# Patient Record
(demographics unavailable — no encounter records)

---

## 2024-10-16 NOTE — PHYSICAL EXAM
[General Appearance - Well Developed] : well developed [General Appearance - Well Nourished] : well nourished [Normal Appearance] : normal appearance [Well Groomed] : well groomed [General Appearance - In No Acute Distress] : no acute distress [Abdomen Soft] : soft [Abdomen Tenderness] : non-tender [Abdomen Mass (___ Cm)] : no abdominal mass palpated [Abdomen Hernia] : no hernia was discovered [Costovertebral Angle Tenderness] : no ~M costovertebral angle tenderness [Urethral Meatus] : meatus normal [Penis Abnormality] : normal circumcised penis [Urinary Bladder Findings] : the bladder was normal on palpation [Scrotum] : the scrotum was normal [Epididymis] : the epididymides were normal [Testes Tenderness] : no tenderness of the testes [Testes Mass (___cm)] : there were no testicular masses [Prostate Tenderness] : the prostate was not tender [No Prostate Nodules] : no prostate nodules [Skin Color & Pigmentation] : normal skin color and pigmentation [Heart Rate And Rhythm] : Heart rate and rhythm were normal [Edema] : no peripheral edema [] : no respiratory distress [Respiration, Rhythm And Depth] : normal respiratory rhythm and effort [Exaggerated Use Of Accessory Muscles For Inspiration] : no accessory muscle use [Oriented To Time, Place, And Person] : oriented to person, place, and time [Affect] : the affect was normal [Mood] : the mood was normal [Not Anxious] : not anxious [Normal Station and Gait] : the gait and station were normal for the patient's age [No Focal Deficits] : no focal deficits [Sensation] : the sensory exam was normal to light touch and pinprick [Inguinal Lymph Nodes Enlarged Bilaterally] : inguinal

## 2024-10-17 NOTE — LETTER BODY
[Dear  ___] : Dear  [unfilled], [Please see my note below.] : Please see my note below. [Consult Closing:] : Thank you very much for allowing me to participate in the care of this patient.  If you have any questions, please do not hesitate to contact me. [Sincerely,] : Sincerely, [DrTanner  ___] : Dr. ALVAREZ [DrTanner ___] : Dr. ALVAREZ [FreeTextEntry3] : Suraj Santana MD, FACS\par

## 2024-10-17 NOTE — ASSESSMENT
[FreeTextEntry1] : I discussed the findings and options with Mr. JULIETTE GRAY in detail.  He is satisfied with his urination and does not want to pursue pharmacologic management.  His PSAs have remained stable and we agreed that he would simply follow-up in 1 year for a PSA and repeat postvoid sonogram.     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

## 2024-10-17 NOTE — HISTORY OF PRESENT ILLNESS
[FreeTextEntry1] : Mr. JULIETTE SALGADO returns for a semiannual follow-up. He reports stable minimal lower urinary tract symptoms (mostly obstructive) with nocturia x 3. He is no longer taking alfuzosin.  In December 2013 he went into acute retention (without an identifiable predisposing factor). IPSS: 7/1 Sono (performed to assess bladder emptying): 52cc PVR  Mr. Salgado also has longstanding erectile dysfunction, which he manages with Cialis 1/2 20mg with a satisfactory response.  PSAs: 9/23/24--9.34; 4/18/24--10.7; 11/21/23--10.2 (PSAD= 0.08); 8/8/23--9.08; 5/26/22--8.31; 2/16/21--8.18; 9/18/19--7.3 (32%); 9/10/18--6.6 (30%); 12/15/17--7.42; 12/17/15--6.8 (25%); 11/12/14--6.9 (28%); 11/7/13--6.4 (28%)  4K score:  9/10/18--5%  Prostate bx: 12/6/13--BPH (He experienced sepsis after this biopsy)  Prostate MRI: 11/24/23--PI-RADS 3.  124 cc prostate; 10/28/19--severe BPH  Abd MRI:  5/8/23--No suspicious liver lesion.  Few benign hepatic hemangiomas corresponding to the lesion on CT.

## 2024-11-07 NOTE — PLAN
[FreeTextEntry1] : CAD - with statin- not at goal LD_- he will f/up with Dr Alejandro-to discuss options HTN - controlled Elev PSA - can check annually - Pirads 2- low risk of malignancy Elevated serum Cr - increase fluids, avoid nephrotoxic agents such as nsaids General fatigue - for now will monitor- if this issue persists he will follow up for further evaluation

## 2024-11-07 NOTE — HISTORY OF PRESENT ILLNESS
[de-identified] : 77 yo M with HTN, HLD, BPH, CAD s/p PCI 5/23 - started [id number] with his crestor 10mg. which helped-  LDL72- increased to 20mg but did not tolerate it.  - now back on crestor 10mg.   Playing a lot of tennis - in the last 6 months-  overall energy is lower.   - ankle issue- seeing podaitrist- wearing inserts.  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  - Elev PSA - MRI with pirads 2.    - stays hydrated.  - using an Infrared sauna.   note:  - he did not tolerate crestor 10mg - now on 5mg.   back to [employer] [facility] partally  following with Dr Santana, Dr Valerio, Dr Ahumada.   had shingrix.   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 due

## 2024-11-11 NOTE — REVIEW OF SYSTEMS
[Abdominal Pain] : no abdominal pain [Nausea] : no nausea [Constipation] : no constipation [Diarrhea] : no diarrhea [Melena] : no melena [Negative] : Constitutional [FreeTextEntry7] : as above

## 2024-11-11 NOTE — HISTORY OF PRESENT ILLNESS
[de-identified] : 77 yo M with HTN, HLD, BPH, CAD s/p PCI 5/23 - was having severe heart burn.  stool has been different was mucous.  lying eugenia- the gerd resolved.  - BM- normal consistency but with a white ring.   - energy is good over the weekend.  - Stools have been this way since coming back from [place] - 1.5 weeks ago.  no Pain.  - has some urgency for BM.   Just cut back on Psyllium husk.   - has a concern about his elevated A1C.   note: 11/7/24 - started [id number] with his crestor 10mg. which helped-  LDL72- increased to 20mg but did not tolerate it.  - now back on crestor 10mg.   Playing a lot of tennis - in the last 6 months-  overall energy is lower.   - ankle issue- seeing podaitrist- wearing inserts.  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  - Elev PSA - MRI with pirads 2.    - stays hydrated.  - using an Infrared sauna.   note:  - he did not tolerate crestor 10mg - now on 5mg.   back to [facility] partally  following with Dr Santana, Dr Valerio, Dr Ahumada.   had shingrix.   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 due

## 2024-11-11 NOTE — PHYSICAL EXAM
[Soft] : abdomen soft [Non-distended] : non-distended [No Masses] : no abdominal mass palpated [No HSM] : no HSM [Normal] : no posterior cervical lymphadenopathy and no anterior cervical lymphadenopathy [de-identified] : mild llq tenderness, no rebound or guarding

## 2024-11-11 NOTE — PLAN
[FreeTextEntry1] : Suspect mild case of diverticulitis after travel - with improving symptoms can check labs today but hold off on CT a/p unless symptoms regress.

## 2024-12-26 NOTE — PLAN
[FreeTextEntry1] : HLD - continue lower dose of statin  check lipids today Elevated serum creatinine - check bmp today.  further workup based on results.

## 2024-12-26 NOTE — HISTORY OF PRESENT ILLNESS
[de-identified] : 79 yo M with HTN, HLD, BPH, CAD s/p PCI 5/23 - here for repeat labs after having elevated serum Creatinine from labs in november  note: 11/11/24 - was having severe heart burn.  stool has been different was mucous.  lying eugenia- the gerd resolved.  - BM- normal consistency but with a white ring.   - energy is good over the weekend.  - Stools have been this way since coming back from [place] - 1.5 weeks ago.  no Pain.  - has some urgency for BM.   Just cut back on Psyllium husk.   - has a concern about his elevated A1C.   note: 11/7/24 - started [id number] with his crestor 10mg. which helped-  LDL72- increased to 20mg but did not tolerate it.  - now back on crestor 10mg.   Playing a lot of tennis - in the last 6 months-  overall energy is lower.   - ankle issue- seeing podaitrist- wearing inserts.  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  - Elev PSA - MRI with pirads 2.    - stays hydrated.  - using an Infrared sauna.   note:  - he did not tolerate crestor 10mg - now on 5mg.   back to [place] partally  following with Dr Santana, Dr Valerio, Dr Ahumada.   had shingrix.   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 due

## 2025-01-07 NOTE — DISCUSSION/SUMMARY
[Patient] : the patient [___ Month(s)] : in [unfilled] month(s) [FreeTextEntry1] : 79 y/o male with h/o htn, hl, gerd, predm, cad who presents for f/up today s/p PCI xience dLCx, D1, pLAD 5/23   -will order repatha as could not tolerate higher dose crestor or zetia -continue crestor   -Cath 5/23: LM mild, prox LAD 75%, D1 80%, dLCx 99%, OM1 mild, prox/mid RCA 30-50%, edp 16, s/p PCI 99% dLCx w xience, s/p PCI 80% D1 w xience, s/p PCI 75% pLAD w xience  -CTA cor 5/23: Cardiac: 1. The calcium score is severe at 946 Agatston units, which is at the 74 percentile, adjusted for age, gender and race. 2. Severe stenosis in D1 and mid LCx. 3. Mild to moderate stenosis in proximal LAD and OM2. 4. Nonobstructive coronary artery disease in remaining segments. Non-cardiac: No acute findings. Small incidental hepatic lesion, likely benign, may be confirmed by nonemergent contrast MRI.  -LpA elevated -ekg 9/24 - nsr, lad, no st/t changes  -labs 2024 reviewed  -on synthroid for thyroid  -continue norvasc  -continue asa  -counseled on cvd risk factors  -Calcium score 9/22: 904, 75%, small calcified aortic mural plaque, mod calcification AV, small type 1 hiatal hernia, mild gynecomastia  -Echo 1/23: ef 55%, abnormal paradoxical septal motion c/w conduction delay, septal wall thickness increased, trace mr, mild tr, prox asc aorta borderline dilated  -Echo 2020: ef 65%, mild basal septal lvh 1.6 cm, no wma, mild mac, mild mr/tr/pr  -f/up 3 months for cad, lipids    I have spent 30 minutes reviewing labs, records, tests and discussing cvd risk factors, cad, lipids.

## 2025-04-25 NOTE — HISTORY OF PRESENT ILLNESS
[No Pertinent Pulmonary History] : no history of asthma, COPD, sleep apnea, or smoking [No Adverse Anesthesia Reaction] : no adverse anesthesia reaction in self or family member [(Patient denies any chest pain, claudication, dyspnea on exertion, orthopnea, palpitations or syncope)] : Patient denies any chest pain, claudication, dyspnea on exertion, orthopnea, palpitations or syncope [Excellent (>10 METs)] : Excellent (>10 METs) [Coronary Artery Disease] : coronary artery disease [Aortic Stenosis] : no aortic stenosis [Atrial Fibrillation] : no atrial fibrillation [Recent Myocardial Infarction] : no recent myocardial infarction [Implantable Device/Pacemaker] : no implantable device/pacemaker [FreeTextEntry1] : left lens dislocation [FreeTextEntry4] :     77 y/o male with h/o htn, hl, gerd, predm, cad, hypothyroid, cad s/p PCI xience dLCx, D1, pLAD 5/23  - here for pre op erative evaluation for left eye lens dislocation - right ankle concern- left ankle swelling for past 8 months - pain after 1 hour of tennis.  Compression sock helps.

## 2025-04-25 NOTE — PHYSICAL EXAM
[Normal] : affect was normal and insight and judgment were intact [de-identified] : trace edema at ankles right > left

## 2025-04-25 NOTE — REVIEW OF SYSTEMS
[Vision Problems] : vision problems [Negative] : Psychiatric [FreeTextEntry3] : loss of peripheral vision left

## 2025-04-29 NOTE — HISTORY OF PRESENT ILLNESS
[FreeTextEntry1] : 79 y/o male with h/o htn, hl, gerd, predm, cad, hypothyroid, cad s/p PCI xience dLCx, D1, pLAD  who presents for f/up today    last seen  started repatha  could not tolerate crestor 20 so back on 10  no cp, sob, syncope, lh, edema, orthopnea, pnd, palpitations, fatigue  could not tolerate zetia  Cath : LM mild, prox LAD 75%, D1 80%, dLCx 99%, OM1 mild, prox/mid RCA 30-50%, edp 16, s/p PCI 99% dLCx w xience, s/p PCI 80% D1 w xience, s/p PCI 75% pLAD w xience    CTA cor :  IMPRESSION:  Cardiac:  1. The calcium score is severe at 946 Agatston units, which is at the 74 percentile, adjusted for age, gender and race.  2. Severe stenosis in D1 and mid LCx.  3. Mild to moderate stenosis in proximal LAD and OM2.  4. Nonobstructive coronary artery disease in remaining segments.    Non-cardiac:  No acute findings. Small incidental hepatic lesion, likely benign, may be confirmed by nonemergent contrast MRI.    on asa, crestor   -Echo : ef 55%, abnormal paradoxical septal motion c/w conduction delay, septal wall thickness increased, trace mr, mild tr, prox asc aorta borderline dilated  -Calcium score : 904, 75%, small calcified aortic mural plaque, mod calcification AV, small type 1 hiatal hernia, mild gynecomastia  -Echo : ef 65%, mild basal septal lvh 1.6 cm, no wma, mild mac, mild mr/tr/pr    seen by Dr. Robertson from cards  had Echo    seen by Dr. Gibbons  for guzman - referred for nuclear, echo    notes some muscle aches on lipitor 10    pescetarian  under stress with work  runs/jogs twice weekly, spinning twice weekly      PMH/PSH:  cad s/p xience PCI dLCx, D1, pLAD   htn  hl  predm  ED  hearing loss  bph  gerd  mr  elevated psa  right shoulder replacement   basal cell carcinoma excision  retinal detachment repair  strabismus surgery  hypothyroid    ALL:  pcn      MEDS: levothyroxine 50 mcg qd  norvasc 7.5 mg qd  crestor 10 mg qd  asa 81 mg qd  mvi  prilosec  tadalafil 20 mg prn  psyllium  coQ10  florastor  gingko  repatha     SH:  former smoker quit , h/o 3 years - 2-3 cigs/day  no etoh  no drugs  2 children - healthy  from [place]  , remarried  ,       FH:  mother -  gastric cancer 53  father -  heart disease 70's  sister -  53 lung cancer  sister - alive, 73 healthy

## 2025-04-29 NOTE — DISCUSSION/SUMMARY
[Patient] : the patient [___ Month(s)] : in [unfilled] month(s) [EKG obtained to assist in diagnosis and management of assessed problem(s)] : EKG obtained to assist in diagnosis and management of assessed problem(s) [FreeTextEntry1] : 79 y/o male with h/o htn, hl, gerd, predm, cad who presents for f/up today s/p PCI xience dLCx, D1, pLAD 5/23   -continue repatha as could not tolerate higher dose crestor or zetia  -continue crestor - can consider lower dose in future   -Cath 5/23: LM mild, prox LAD 75%, D1 80%, dLCx 99%, OM1 mild, prox/mid RCA 30-50%, edp 16, s/p PCI 99% dLCx w xience, s/p PCI 80% D1 w xience, s/p PCI 75% pLAD w xience  -CTA cor 5/23: Cardiac: 1. The calcium score is severe at 946 Agatston units, which is at the 74 percentile, adjusted for age, gender and race. 2. Severe stenosis in D1 and mid LCx. 3. Mild to moderate stenosis in proximal LAD and OM2. 4. Nonobstructive coronary artery disease in remaining segments. Non-cardiac: No acute findings. Small incidental hepatic lesion, likely benign, may be confirmed by nonemergent contrast MRI.  -LpA elevated  -ekg 1/25 reviewed  -ekg ordered today - nsr, lad, no st/t changes  -labs 2025 reviewed, cbc ordered today  -on synthroid for thyroid  -continue norvasc  -continue asa  -counseled on cvd risk factors  -Calcium score 9/22: 904, 75%, small calcified aortic mural plaque, mod calcification AV, small type 1 hiatal hernia, mild gynecomastia  -Echo 1/23: ef 55%, abnormal paradoxical septal motion c/w conduction delay, septal wall thickness increased, trace mr, mild tr, prox asc aorta borderline dilated  -Echo 2020: ef 65%, mild basal septal lvh 1.6 cm, no wma, mild mac, mild mr/tr/pr  -f/up 6 months for cvd risk factors    I have spent 30 minutes reviewing labs, records, tests and discussing cvd risk factors, cad, lipids.

## 2025-05-12 NOTE — PHYSICAL EXAM
[LE] : Sensory: Intact in bilateral lower extremities [Normal RLE] : Right Lower Extremity: No scars, rashes, lesions, ulcers, skin intact [Normal LLE] : Left Lower Extremity: No scars, rashes, lesions, ulcers, skin intact [Normal Touch] : sensation intact for touch [Normal] : Oriented to person, place, and time, insight and judgement were intact and the affect was normal [de-identified] : Bilateral feet and ankles No edema, ecchymosis, erythema. Severe pes planovalgus deformity bilateral feet.  Positive too many toes signs bilaterally. He walks with a mild limp but denies pain.  He can walk on his heels.  He can walk on the toes but without heel inversion. Fixed forefoot supination. Tender tarsometatarsal joints. Posterior tibial tendon feels intact bilaterally with minimal tenderness and no thick. Ankle range of motion is with about 5 degrees dorsiflexion and 30 degrees plantarflexion. Decreased subtalar motion left greater than right ankle. Feet are warm with normal capillary refill [de-identified] :  X-rays ordered, performed and reviewed today of bilateral foot/ankle for foot/ankle pain weightbearing 5 views showed pes planovalgus deformity bilaterally with severe degenerative changes left 1st, 2nd and 3rd tarsometatarsal joints and more moderate on the right. Ankle mortise is intact.  Subtalar joint difficult to appreciate

## 2025-05-12 NOTE — HISTORY OF PRESENT ILLNESS
[de-identified] :  Mr. Salgado is a 79 year old man who comes in for evaluation for LEFT > RIGHT foot and ankle pain and swelling that started several years ago, around 2018, with no injury.  He has seen a podiatrist in the past for flat feet. He was told there wasn't anything he could do besides orthotics which he does use.  The orthotics he currently uses is a year old. He saw his PCP a couple weeks ago and mentioned the swelling in his medial ankles. He was referred for MRI of RIGHT ankle which he had on 4/30/25. He is on amlodipine and noticed increase in swelling his dose was increased.  He is very active and is not significantly limited by pain. He runs, plays tennis and does martial arts. He wears compression sleeves for activity which help.  Running and high-impact exercise like tennis can aggravate his foot and ankle pain.  Voltaren gel also helps. His pain is localized to the top of his feet. Pain recently is worse on the left than right.  He uses compression socks when he plays tennis which helps.  His pain can be an 8 out of 10 intermittently.  He would like to avoid surgery.

## 2025-05-12 NOTE — PHYSICAL EXAM
[LE] : Sensory: Intact in bilateral lower extremities [Normal RLE] : Right Lower Extremity: No scars, rashes, lesions, ulcers, skin intact [Normal LLE] : Left Lower Extremity: No scars, rashes, lesions, ulcers, skin intact [Normal Touch] : sensation intact for touch [Normal] : Oriented to person, place, and time, insight and judgement were intact and the affect was normal [de-identified] : Bilateral feet and ankles No edema, ecchymosis, erythema. Severe pes planovalgus deformity bilateral feet.  Positive too many toes signs bilaterally. He walks with a mild limp but denies pain.  He can walk on his heels.  He can walk on the toes but without heel inversion. Fixed forefoot supination. Tender tarsometatarsal joints. Posterior tibial tendon feels intact bilaterally with minimal tenderness and no thick. Ankle range of motion is with about 5 degrees dorsiflexion and 30 degrees plantarflexion. Decreased subtalar motion left greater than right ankle. Feet are warm with normal capillary refill [de-identified] :  X-rays ordered, performed and reviewed today of bilateral foot/ankle for foot/ankle pain weightbearing 5 views showed pes planovalgus deformity bilaterally with severe degenerative changes left 1st, 2nd and 3rd tarsometatarsal joints and more moderate on the right. Ankle mortise is intact.  Subtalar joint difficult to appreciate

## 2025-05-12 NOTE — ASSESSMENT
[FreeTextEntry1] : 78 y/o male with severe pes planovalgus deformity with collapse through the midfoot primarily with severe tarsometatarsal arthritis and on the left there may also be some subtalar arthritis. He is not interested in surgical treatment although I did speak to him briefly about treatments for flatfoot deformity with arthritis.  Typically a fusion would be done at the tarsometatarsal joints with possible subtalar fusion if there was arthritis on the left.  We would need an MRI to see that better which she can get if pain is not improving with the nonoperative treatment. He wears orthotics which are good and should wear shoes with good support.  He can use Voltaren gel.  He can take occasional Tylenol or medication for pain. He could try some physical therapy to see if this helps.  Compression stockings for swelling. [place] brace may be considered but he seems to be doing too well to consider that right now. Follow-up as needed if pain is increasing and to get the MRI of the left ankle before that.

## 2025-05-12 NOTE — HISTORY OF PRESENT ILLNESS
[de-identified] :  Mr. Salgado is a 79 year old man who comes in for evaluation for LEFT > RIGHT foot and ankle pain and swelling that started several years ago, around 2018, with no injury.  He has seen a podiatrist in the past for flat feet. He was told there wasn't anything he could do besides orthotics which he does use.  The orthotics he currently uses is a year old. He saw his PCP a couple weeks ago and mentioned the swelling in his medial ankles. He was referred for MRI of RIGHT ankle which he had on 4/30/25. He is on amlodipine and noticed increase in swelling his dose was increased.  He is very active and is not significantly limited by pain. He runs, plays tennis and does martial arts. He wears compression sleeves for activity which help.  Running and high-impact exercise like tennis can aggravate his foot and ankle pain.  Voltaren gel also helps. His pain is localized to the top of his feet. Pain recently is worse on the left than right.  He uses compression socks when he plays tennis which helps.  His pain can be an 8 out of 10 intermittently.  He would like to avoid surgery.

## 2025-05-12 NOTE — ASSESSMENT
[FreeTextEntry1] : 80 y/o male with severe pes planovalgus deformity with collapse through the midfoot primarily with severe tarsometatarsal arthritis and on the left there may also be some subtalar arthritis. He is not interested in surgical treatment although I did speak to him briefly about treatments for flatfoot deformity with arthritis.  Typically a fusion would be done at the tarsometatarsal joints with possible subtalar fusion if there was arthritis on the left.  We would need an MRI to see that better which she can get if pain is not improving with the nonoperative treatment. He wears orthotics which are good and should wear shoes with good support.  He can use Voltaren gel.  He can take occasional Tylenol or medication for pain. He could try some physical therapy to see if this helps.  Compression stockings for swelling. [place] brace may be considered but he seems to be doing too well to consider that right now. Follow-up as needed if pain is increasing and to get the MRI of the left ankle before that.